# Patient Record
Sex: FEMALE
[De-identification: names, ages, dates, MRNs, and addresses within clinical notes are randomized per-mention and may not be internally consistent; named-entity substitution may affect disease eponyms.]

---

## 2017-04-21 ENCOUNTER — RX ONLY (OUTPATIENT)
Age: 67
Setting detail: RX ONLY
End: 2017-04-21

## 2023-08-23 ENCOUNTER — OFFICE VISIT (OUTPATIENT)
Dept: URGENT CARE | Facility: URGENT CARE | Age: 73
End: 2023-08-23
Payer: MEDICARE

## 2023-08-23 VITALS
HEART RATE: 69 BPM | OXYGEN SATURATION: 98 % | RESPIRATION RATE: 18 BRPM | SYSTOLIC BLOOD PRESSURE: 159 MMHG | DIASTOLIC BLOOD PRESSURE: 87 MMHG | TEMPERATURE: 97.4 F

## 2023-08-23 DIAGNOSIS — R39.9 UTI SYMPTOMS: ICD-10-CM

## 2023-08-23 DIAGNOSIS — N39.0 COMPLICATED UTI (URINARY TRACT INFECTION): Primary | ICD-10-CM

## 2023-08-23 LAB
BILIRUB UR QL: NEGATIVE
CLARITY UR: CLEAR
COLOR UR: YELLOW
GLUCOSE UR QL: NEGATIVE MG/DL
HGB UR QL: NEGATIVE
KETONES UR-MCNC: 15 MG/DL
LEUKOCYTE ESTERASE UR QL STRIP: NEGATIVE
NITRITE UR QL: NEGATIVE
PH UR: 5 PH
PROT UR STRIP-MCNC: 30 MG/DL
SP GR UR: >=1.03 (ref 1–1.03)
UROBILINOGEN UR-MCNC: 1 MG/DL

## 2023-08-23 PROCEDURE — G0463 HOSPITAL OUTPT CLINIC VISIT: HCPCS | Mod: PO | Performed by: PHYSICIAN ASSISTANT

## 2023-08-23 PROCEDURE — 99203 OFFICE O/P NEW LOW 30 MIN: CPT | Performed by: PHYSICIAN ASSISTANT

## 2023-08-23 PROCEDURE — 81003 URINALYSIS AUTO W/O SCOPE: CPT | Mod: PO | Performed by: PHYSICIAN ASSISTANT

## 2023-08-23 PROCEDURE — 87088 URINE BACTERIA CULTURE: CPT | Performed by: PHYSICIAN ASSISTANT

## 2023-08-23 RX ORDER — NAPROXEN SODIUM 220 MG/1
81 TABLET, FILM COATED ORAL DAILY
COMMUNITY

## 2023-08-23 RX ORDER — OFLOXACIN 3 MG/ML
SOLUTION AURICULAR (OTIC)
COMMUNITY

## 2023-08-23 RX ORDER — LOSARTAN POTASSIUM 100 MG/1
100 TABLET ORAL DAILY
COMMUNITY
Start: 2023-08-06

## 2023-08-23 RX ORDER — ASCORBIC ACID 500 MG
500 TABLET ORAL DAILY
COMMUNITY

## 2023-08-23 RX ORDER — GABAPENTIN 100 MG/1
CAPSULE ORAL
COMMUNITY
End: 2023-08-23

## 2023-08-23 RX ORDER — ATORVASTATIN CALCIUM 40 MG/1
40 TABLET, FILM COATED ORAL NIGHTLY
COMMUNITY
Start: 2023-08-09

## 2023-08-23 RX ORDER — CIPROFLOXACIN 500 MG/1
500 TABLET ORAL 2 TIMES DAILY
Qty: 14 TABLET | Refills: 0 | Status: SHIPPED | OUTPATIENT
Start: 2023-08-23 | End: 2023-08-30

## 2023-08-23 RX ORDER — BACLOFEN 10 MG/1
TABLET ORAL
COMMUNITY

## 2023-08-23 RX ORDER — AMLODIPINE BESYLATE 5 MG/1
TABLET ORAL
COMMUNITY
Start: 2022-09-21

## 2023-08-23 RX ORDER — HYDROCHLOROTHIAZIDE 25 MG/1
TABLET ORAL
COMMUNITY

## 2023-08-23 RX ORDER — METFORMIN HYDROCHLORIDE 500 MG/1
1000 TABLET ORAL 2 TIMES DAILY
COMMUNITY
Start: 2023-06-13

## 2023-08-23 NOTE — PROGRESS NOTES
Subjective      Clover Baker is a 73 y.o. female who presents for Urinary Symptom (C/o lower back pain, urgency, difficulty starting urination that around 8/14. Pt has a UTI - diagnosed at another and recently finished antibiotics (Cephalexin). )  .      HPI    Patient presents to the Urgent Care clinic today with complaints of lower back pain.       Patient presents today complaining bilateral lower back pain worse in the right and left and continued urinary tract symptoms.  She was treated for UTI on 814 with Keflex for 7 days.  States that symptoms have not resolved.    She denies any nausea vomiting body aches fever chills.  Denies any blood in the urine.  She denies any pain radiation      The following have been reviewed and updated as appropriate in this visit:                  Current Outpatient Medications   Medication Sig Dispense Refill    geriatric multivit-iron-mins tablet Take 1 tablet by mouth daily      metFORMIN (GLUCOPHAGE) 500 mg tablet Take 2 tablets (1,000 mg total) by mouth 2 (two) times a day      losartan (COZAAR) 100 mg tablet Take 1 tablet (100 mg total) by mouth daily      atorvastatin (LIPITOR) 40 mg tablet Take 1 tablet (40 mg total) by mouth nightly      aspirin 81 mg chewable tablet Take 1 tablet (81 mg total) by mouth daily      ascorbic acid, vitamin C, (VITAMIN C) 500 mg tablet Take 1 tablet (500 mg total) by mouth daily      ofloxacin (FLOXIN) 0.3 % otic solution INSTILL 10 DROPS INTO AFFECTED EAR(S) ONCE DAILY      hydroCHLOROthiazide (HYDRODIURIL) 25 mg tablet       baclofen (LIORESAL) 10 mg tablet       amLODIPine (NORVASC) 5 mg tablet       ciprofloxacin (Cipro) 500 mg tablet Take 1 tablet (500 mg total) by mouth 2 (two) times a day for 7 days 14 tablet 0     No current facility-administered medications for this visit.         Review of Systems  See HPI    Objective     Vitals:  /87   Pulse 69   Temp 36.3 °C (97.4 °F) (Temporal)   Resp 18   SpO2 98%       Physical  Exam  Vitals and nursing note reviewed.   Constitutional:       General: She is not in acute distress.     Appearance: Normal appearance. She is normal weight. She is not ill-appearing, toxic-appearing or diaphoretic.   Cardiovascular:      Rate and Rhythm: Normal rate.   Abdominal:      Tenderness: There is abdominal tenderness. There is right CVA tenderness and left CVA tenderness.      Comments: CVA tenderness greater on the right than left   Skin:     General: Skin is warm and dry.   Neurological:      Mental Status: She is alert.           Recent Results (from the past 24 hour(s))   Urinalysis, dipstick Urine, Clean Catch    Collection Time: 08/23/23  3:02 PM   Result Value Ref Range    Color, Urine Yellow Yellow    Clarity, Urine Clear Clear    Specific Gravity, Urine >=1.030 1.003 - 1.030    Leukocytes, Urine Negative Negative    Nitrite, Urine Negative Negative    Protein, Urine 30 (A) Negative MG/DL    Ketones, Urine 15 (A) Negative MG/DL    Urobilinogen, Urine 1.0 <2.0 mg/dL    Bilirubin, Urine Negative Negative    Blood, Urine Negative Negative    Glucose, Urine Negative Negative MG/DL    pH, Urine 5.0 5.0 - 8.0 PH         Assessment/Plan     Diagnoses and all orders for this visit:    Complicated UTI (urinary tract infection)  -     ciprofloxacin (Cipro) 500 mg tablet; Take 1 tablet (500 mg total) by mouth 2 (two) times a day for 7 days    UTI symptoms  -     Urinalysis, dipstick Urine, Clean Catch            MDM    UA is equivocal we will submit for culture.  Positive CVA tenderness to the right and left.  Did have some suprapubic tenderness upon palpation as well.  No signs of systemic infection present today.  Patient will be treated for complicated UTI with Cipro.  Advised 80 to 100 fluid ounces water a day with cranberry juice or cranberry pill supplementation.  Patient is in the [place] for a trail ride with mules in the [facility].  Did advise her that if symptoms change or worsen to present to  the [facility] emergency department for reevaluation.      No follow-ups on file.    The patient verbalizes agreement and understanding the plan    MEAGAN Pizano       A voice recognition program was used to aid in documentation of this record. Sometimes words are not printed exactly as they were spoken.  While efforts were made to carefully edit and correct any inaccuracies, some areas may be present; please take these into context.  Please contact the provider if areas are identified.

## 2023-08-24 LAB — BACTERIA UR CULT: NORMAL
